# Patient Record
Sex: FEMALE | Race: WHITE | Employment: OTHER | URBAN - METROPOLITAN AREA
[De-identification: names, ages, dates, MRNs, and addresses within clinical notes are randomized per-mention and may not be internally consistent; named-entity substitution may affect disease eponyms.]

---

## 2021-12-01 ENCOUNTER — HOSPITAL ENCOUNTER (EMERGENCY)
Facility: HOSPITAL | Age: 31
Discharge: HOME | End: 2021-12-02
Attending: EMERGENCY MEDICINE
Payer: MEDICAID

## 2021-12-01 DIAGNOSIS — G43.809 OTHER MIGRAINE WITHOUT STATUS MIGRAINOSUS, NOT INTRACTABLE: Primary | ICD-10-CM

## 2021-12-01 DIAGNOSIS — R93.89 ABNORMAL MRI: ICD-10-CM

## 2021-12-01 PROCEDURE — 82962 GLUCOSE BLOOD TEST: CPT

## 2021-12-01 PROCEDURE — 36415 COLL VENOUS BLD VENIPUNCTURE: CPT

## 2021-12-01 PROCEDURE — 99284 EMERGENCY DEPT VISIT MOD MDM: CPT

## 2021-12-02 ENCOUNTER — APPOINTMENT (OUTPATIENT)
Dept: CT IMAGING | Facility: HOSPITAL | Age: 31
End: 2021-12-02
Attending: EMERGENCY MEDICINE
Payer: MEDICAID

## 2021-12-02 ENCOUNTER — APPOINTMENT (OUTPATIENT)
Dept: MRI IMAGING | Facility: HOSPITAL | Age: 31
End: 2021-12-02
Attending: EMERGENCY MEDICINE
Payer: MEDICAID

## 2021-12-02 VITALS
HEART RATE: 65 BPM | DIASTOLIC BLOOD PRESSURE: 89 MMHG | TEMPERATURE: 98 F | OXYGEN SATURATION: 98 % | RESPIRATION RATE: 18 BRPM | SYSTOLIC BLOOD PRESSURE: 125 MMHG

## 2021-12-02 PROCEDURE — 80048 BASIC METABOLIC PNL TOTAL CA: CPT | Performed by: EMERGENCY MEDICINE

## 2021-12-02 PROCEDURE — 70450 CT HEAD/BRAIN W/O DYE: CPT | Performed by: EMERGENCY MEDICINE

## 2021-12-02 PROCEDURE — 85025 COMPLETE CBC W/AUTO DIFF WBC: CPT | Performed by: EMERGENCY MEDICINE

## 2021-12-02 PROCEDURE — 83735 ASSAY OF MAGNESIUM: CPT | Performed by: EMERGENCY MEDICINE

## 2021-12-02 PROCEDURE — 70551 MRI BRAIN STEM W/O DYE: CPT | Performed by: EMERGENCY MEDICINE

## 2021-12-02 RX ORDER — POTASSIUM CHLORIDE 20 MEQ/1
40 TABLET, EXTENDED RELEASE ORAL ONCE
Status: COMPLETED | OUTPATIENT
Start: 2021-12-02 | End: 2021-12-02

## 2021-12-02 NOTE — ED PROVIDER NOTES
Patient Seen in: [facility] AND [facility] Emergency Department      History   Patient presents with:  Stroke    Stated Complaint: headache/slurring words    Subjective:   HPI  Patient is a 28-year-old female history pituitary tumor, migraines presenting with he Abdominal:      General: There is no distension. Tenderness: There is no abdominal tenderness. There is no guarding or rebound. Musculoskeletal:         General: No swelling, tenderness or deformity. Normal range of motion.       Cervical back: Nor hydrocephalus. Visualized paranasal sinuses and mastoids are clear. No calvarial lesion. MR BRAIN WITHOUT CONTRAST    COMPARISON: None    IMPRESSION:    No abnormality on diffusion weighted imaging.   There are periventricular and juxtacortical T2 hy

## 2021-12-02 NOTE — ED INITIAL ASSESSMENT (HPI)
Patient ambulatory to ED with complaint of loss of balance, slurring words, and severe headache since 2PM. Patient is AXOX4.

## 2023-10-29 ENCOUNTER — HOSPITAL ENCOUNTER (EMERGENCY)
Facility: HOSPITAL | Age: 33
Discharge: HOME | End: 2023-10-29
Attending: EMERGENCY MEDICINE
Payer: MEDICAID

## 2023-10-29 VITALS
OXYGEN SATURATION: 98 % | HEIGHT: 64 IN | DIASTOLIC BLOOD PRESSURE: 92 MMHG | RESPIRATION RATE: 18 BRPM | HEART RATE: 86 BPM | TEMPERATURE: 98 F | WEIGHT: 194 LBS | SYSTOLIC BLOOD PRESSURE: 121 MMHG | BODY MASS INDEX: 33.12 KG/M2

## 2023-10-29 DIAGNOSIS — R60.0 SALIVARY GLAND SWELLING: Primary | ICD-10-CM

## 2023-10-29 PROCEDURE — 87798 DETECT AGENT NOS DNA AMP: CPT | Performed by: EMERGENCY MEDICINE

## 2023-10-29 PROCEDURE — 86735 MUMPS ANTIBODY: CPT | Performed by: EMERGENCY MEDICINE

## 2023-10-29 PROCEDURE — 36415 COLL VENOUS BLD VENIPUNCTURE: CPT

## 2023-10-29 PROCEDURE — 99283 EMERGENCY DEPT VISIT LOW MDM: CPT

## 2023-10-29 RX ORDER — DEXAMETHASONE SODIUM PHOSPHATE 4 MG/ML
8 INJECTION, SOLUTION INTRA-ARTICULAR; INTRALESIONAL; INTRAMUSCULAR; INTRAVENOUS; SOFT TISSUE ONCE
Status: COMPLETED | OUTPATIENT
Start: 2023-10-29 | End: 2023-10-29

## 2023-10-29 NOTE — DISCHARGE INSTRUCTIONS
Take Tylenol as needed for discomfort. You have been given a long-acting steroid in the emergency department, you should not need additional doses of this medication. See primary care if not improving in the next 2 to 3 days. Return to the ER if you develop worsening symptoms, difficulty breathing or swallowing, inability to tolerate fluids, or any emergent concerns. [Seizure] : seizures [Hypotonicity (Flaccid)] : hypotonic [Diaphoresis] : diaphoretic [Fever] : no fever [Stridor] : no stridor [Cyanosis] : no cyanosis [Edema] : no edema [Tachypnea] : not tachypneic [Wheezing] : no wheezing [Cough] : no cough [Vomiting] : no vomiting [Diarrhea] : no diarrhea [Puffy Hands/Feet] : no hand/feet puffiness [Rash] : no rash [Bruising] : no tendency for easy bruising [Dec Urine Output] : no oliguria [FreeTextEntry2] : low muscle tone - GJ tube for feeds and meds - Ketogenic diet

## 2023-10-29 NOTE — ED INITIAL ASSESSMENT (HPI)
33 y/o female arrives with x 4 days of bilateral preauricular and submandibular swelling. Pt contacted her pcp and was told to come in for eval of possible parotitis. Pt reports recent URI sx. Denies fever/chills. No difficulty breathing.

## 2023-10-30 LAB — MUV IGG SER IA-ACNC: 16.9 AU/ML (ref 11–?)

## 2023-10-31 LAB — MUMPS IGM ABS: <0.8 AU

## 2023-11-01 LAB — MUMPS ANTIBODY RNA (PCR),QUALITATIVE: NEGATIVE

## 2023-11-05 ENCOUNTER — EXTERNAL LAB (OUTPATIENT)
Dept: OTHER | Age: 33
End: 2023-11-05

## 2023-11-05 LAB
25(OH)D3+25(OH)D2 SERPL-MCNC: 25.1 NG/ML (ref 30–100)
ALBUMIN SERPL-MCNC: 4.5 G/DL (ref 3.3–5.2)
ALP SERPL-CCNC: 67 U/L (ref 35–115)
ALT SERPL-CCNC: 9 U/L (ref 7–48)
AMORPH SED URNS QL MICRO: ABNORMAL
APPEARANCE UR: ABNORMAL
AST SERPL-CCNC: 10 U/L (ref 7–48)
B-HCG UR QL: NEGATIVE
BACTERIA #/AREA URNS HPF: ABNORMAL /[HPF]
BASOPHILS NFR BLD: 0.8 % (ref 0–5)
BILIRUB SERPL-MCNC: 0.5 MG/DL (ref 0.2–1.2)
BILIRUB UR QL: NEGATIVE
BUN SERPL-MCNC: 17 MG/DL (ref 6–24)
BUN/CREAT SERPL: 20.7 MG/DL (ref 5–26)
CALCIUM SERPL-MCNC: 9.2 MG/DL (ref 8.3–11)
CHLORIDE SERPL-SCNC: 102 MEQ/L (ref 98–108)
CHOLEST SERPL-MCNC: 258 MG/DL (ref 134–200)
CHOLEST/HDLC SERPL: 4.4 {RATIO} (ref 0–5)
CO2 SERPL-SCNC: 27 MEQ/L (ref 21–31)
COLOR UR: ABNORMAL
CREAT SERPL-MCNC: 0.82 MG/DL (ref 0.6–1.4)
EOSINOPHIL NFR BLD: 1 % (ref 0–6)
EPI CELLS #/AREA URNS LPF: ABNORMAL /[LPF]
ERYTHROCYTE [DISTWIDTH] IN BLOOD: 14.6 % (ref 11.5–14.5)
ERYTHROCYTE [SEDIMENTATION RATE] IN BLOOD BY WESTERGREN METHOD: 6 MM/HR (ref 0–15)
FERRITIN SERPL-MCNC: 8.1 NG/ML (ref 11–307)
FOLATE SERPL-MCNC: 13.46 NG/ML (ref 5.21–40)
FSH SERPL-ACNC: 7.05 MU/ML
GFR SERPLBLD SCHWARTZ-ARVRAT: 98 ML/MIN
GLUCOSE SERPL-MCNC: 78 MG/DL (ref 65–110)
GLUCOSE UR-MCNC: NORMAL MG/DL
HBA1C MFR BLD: 5.2 % (ref 0–5.7)
HCT VFR BLD CALC: 38.5 % (ref 37–47)
HDLC SERPL-MCNC: 58 MG/DL
HGB BLD-MCNC: 12.4 GM/DL (ref 12.1–15.1)
HGB UR QL: ABNORMAL
IRON SERPL-MCNC: 76 UG/DL (ref 55–165)
KETONES UR-MCNC: NEGATIVE MG/DL
LAB RESULT: NORMAL
LDLC SERPL CALC-MCNC: 137.4 MG/DL (ref 30–100)
LENGTH OF FAST TIME PATIENT: ABNORMAL H
LENGTH OF FAST TIME PATIENT: NORMAL H
LEUKOCYTE ESTERASE UR QL STRIP: 500
LH SERPL-ACNC: 12.15 MU/ML (ref 1.2–1286)
LYMPHOCYTES NFR BLD: 31.4 % (ref 18–47)
MAGNESIUM SERPL-MCNC: 1.89 MG/DL (ref 1.7–2.8)
MCH RBC QN AUTO: 29.7 PG (ref 27–33)
MCHC RBC AUTO-ENTMCNC: 32.1 G/DL (ref 32–36)
MCV RBC AUTO: 92.5 FL (ref 82–99)
MONOCYTES NFR BLD: 6.2 % (ref 0–12)
MPV (OFFPRE2): 9.3 FL (ref 7.4–10.4)
NEUTROPHILS # BLD: 8.7 10**3/UL (ref 1.8–7.7)
NEUTROPHILS NFR BLD: ABNORMAL % (ref 40–75)
NITRITE UR QL: NEGATIVE
PH UR: 6 [PH] (ref 5–9)
PLATELET # BLD: 277 10**3/UL (ref 130–400)
POTASSIUM SERPL-SCNC: 3.5 MEQ/L (ref 3.5–5.3)
PROLACTIN SERPL-MCNC: 28.8 NG/ML (ref 0–25)
PROT SERPL-MCNC: 7 GM/DL (ref 6–8.5)
PROT UR QL: 100 MG/DL
RBC # BLD: 4.16 10**6/UL (ref 4.1–5.4)
RBC #/AREA URNS HPF: ABNORMAL /[HPF]
SODIUM SERPL-SCNC: 139 MEQ/L (ref 135–148)
SP GR UR: 1.04 (ref 1–1.03)
T3RU NFR SERPL: 41.6 % (ref 32–48.4)
T4 SERPL-MCNC: 8.38 UG/DL (ref 4.5–11.8)
TIBC SERPL-MCNC: 314 UG/DL (ref 257–369)
TRIGL SERPL-MCNC: 313 MG/DL (ref 30–150)
TSH SERPL-ACNC: 2 UU/ML (ref 0.35–6)
UROBILINOGEN UR QL: <2 MG/DL
VIT B12 SERPL-MCNC: 152 PG/ML (ref 180–914)
VLDLC SERPL CALC-MCNC: 62.6 MG/DL (ref 0–120)
WBC # BLD: 14.4 10**3/UL (ref 4–10)
WBC #/AREA URNS HPF: ABNORMAL /[HPF]
YEAST URNS QL MICRO: ABNORMAL

## 2023-11-14 ENCOUNTER — EXTERNAL RECORD (OUTPATIENT)
Dept: HEALTH INFORMATION MANAGEMENT | Facility: OTHER | Age: 33
End: 2023-11-14

## 2023-11-28 ENCOUNTER — TELEPHONE (OUTPATIENT)
Dept: ENDOCRINOLOGY | Age: 33
End: 2023-11-28

## 2023-11-28 ENCOUNTER — EXTERNAL RECORD (OUTPATIENT)
Dept: HEALTH INFORMATION MANAGEMENT | Facility: OTHER | Age: 33
End: 2023-11-28

## 2023-11-29 ENCOUNTER — APPOINTMENT (OUTPATIENT)
Dept: ENDOCRINOLOGY | Age: 33
End: 2023-11-29

## 2024-04-18 ENCOUNTER — EXTERNAL LAB (OUTPATIENT)
Dept: HEALTH INFORMATION MANAGEMENT | Facility: OTHER | Age: 34
End: 2024-04-18

## 2024-04-18 LAB
25(OH)D3+25(OH)D2 SERPL-MCNC: 22.1 NG/ML (ref 30–100)
ALBUMIN SERPL-MCNC: 4.6 G/DL (ref 3.3–5.2)
ALP SERPL-CCNC: 80 U/L (ref 35–115)
ALT SERPL-CCNC: 12 U/L (ref 7–48)
APTT PPP: 30.1 SECONDS (ref 15–37)
AST SERPL-CCNC: 14 U/L (ref 7–48)
BASOPHILS NFR BLD: 0.5 % (ref 0–5)
BILIRUB SERPL-MCNC: 0.5 MG/DL (ref 0.2–1.2)
BUN SERPL-MCNC: 10 MG/DL (ref 6–24)
BUN/CREAT SERPL: 12.3 MG/DL (ref 5–26)
CALCIUM SERPL-MCNC: 9.6 MG/DL (ref 8.3–11)
CHLORIDE SERPL-SCNC: 102 MEQ/L (ref 98–108)
CHOLEST SERPL-MCNC: 256 MG/DL (ref 134–200)
CHOLEST/HDLC SERPL: 5.3 {RATIO} (ref 0–5)
CO2 SERPL-SCNC: 26 MEQ/L (ref 21–31)
CREAT SERPL-MCNC: 0.81 MG/DL (ref 0.6–1.4)
EOSINOPHIL NFR BLD: 1.1 % (ref 0–6)
ERYTHROCYTE [DISTWIDTH] IN BLOOD: 14.7 % (ref 11.5–14.5)
ERYTHROCYTE [SEDIMENTATION RATE] IN BLOOD BY WESTERGREN METHOD: 5 MM/HR (ref 0–15)
FERRITIN SERPL-MCNC: 14.6 NG/ML (ref 11–307)
FSH SERPL-ACNC: 4.95 MU/ML
FT4I SERPL CALC-MCNC: 10.7 UG/DL (ref 4–11)
GFR SERPLBLD SCHWARTZ-ARVRAT: 98.9 ML/MIN
GLUCOSE SERPL-MCNC: 79 MG/DL (ref 65–110)
HCG SERPL QL: NEGATIVE
HCT VFR BLD CALC: 41.8 % (ref 37–47)
HDLC SERPL-MCNC: 48 MG/DL
HGB BLD-MCNC: 13.6 GM/DL (ref 12.1–15.1)
INR PPP: 0.95 (ref 2–3)
IRON SATN MFR SERPL: 20.7 %
IRON SERPL-MCNC: 83 UG/DL (ref 55–165)
LAB RESULT: NORMAL
LDLC SERPL CALC-MCNC: 144 MG/DL (ref 30–100)
LENGTH OF FAST TIME PATIENT: ABNORMAL H
LENGTH OF FAST TIME PATIENT: NORMAL H
LH SERPL-ACNC: 10.44 MU/ML
LYMPHOCYTES NFR BLD: 13.8 % (ref 18–47)
MAGNESIUM SERPL-MCNC: 1.75 MG/DL (ref 1.7–2.8)
MCH RBC QN AUTO: 29 PG (ref 27–33)
MCHC RBC AUTO-ENTMCNC: 32.5 G/DL (ref 32–36)
MCV RBC AUTO: 89.3 FL (ref 82–99)
MONOCYTES NFR BLD: 5.6 % (ref 0–12)
NEUTROPHILS # BLD: 8.7 10**3/UL (ref 1.8–7.7)
NEUTROPHILS NFR BLD: 79 % (ref 40–75)
PLATELET # BLD: 245 10**3/UL (ref 130–400)
PMV BLD AUTO: 9.5 FL (ref 7.4–10.4)
POTASSIUM SERPL-SCNC: 4.6 MEQ/L (ref 3.5–5.3)
PROLACTIN SERPL-MCNC: 21.93 NG/ML (ref 0–25)
PROT SERPL-MCNC: 7.3 GM/DL (ref 6–8.5)
PROTHROMBIN TIME: 10.9 SECONDS
RBC # BLD: 4.69 10**6/UL (ref 4.1–5.4)
SODIUM SERPL-SCNC: 139 MEQ/L (ref 135–148)
T3RU NFR SERPL: 42.9 % (ref 32–48.4)
T4 SERPL-MCNC: 9.99 UG/DL (ref 4.5–11.8)
TIBC SERPL-MCNC: 318 UG/DL (ref 257–369)
TRIGL SERPL-MCNC: 320 MG/DL (ref 30–150)
TSH SERPL-ACNC: 1.79 UU/ML (ref 0.35–6)
VLDLC SERPL CALC-MCNC: 64 MG/DL (ref 0–120)
WBC # BLD: 11 10**3/UL (ref 4–10)

## 2024-05-01 ENCOUNTER — APPOINTMENT (OUTPATIENT)
Dept: ENDOCRINOLOGY | Age: 34
End: 2024-05-01

## 2024-05-01 ENCOUNTER — LAB SERVICES (OUTPATIENT)
Dept: ENDOCRINOLOGY | Age: 34
End: 2024-05-01

## 2024-05-01 VITALS
SYSTOLIC BLOOD PRESSURE: 126 MMHG | HEIGHT: 64 IN | WEIGHT: 195.66 LBS | TEMPERATURE: 97.5 F | DIASTOLIC BLOOD PRESSURE: 92 MMHG | HEART RATE: 70 BPM | OXYGEN SATURATION: 98 % | BODY MASS INDEX: 33.4 KG/M2

## 2024-05-01 DIAGNOSIS — L68.0 HIRSUTISM: ICD-10-CM

## 2024-05-01 DIAGNOSIS — R79.89 ELEVATED PROLACTIN LEVEL: Primary | ICD-10-CM

## 2024-05-01 DIAGNOSIS — R63.5 WEIGHT GAIN, ABNORMAL: ICD-10-CM

## 2024-05-01 DIAGNOSIS — E78.5 DYSLIPIDEMIA: ICD-10-CM

## 2024-05-01 DIAGNOSIS — R79.89 ELEVATED PROLACTIN LEVEL: ICD-10-CM

## 2024-05-01 DIAGNOSIS — N92.6 IRREGULAR MENSTRUAL CYCLE: ICD-10-CM

## 2024-05-01 PROCEDURE — 36415 COLL VENOUS BLD VENIPUNCTURE: CPT | Performed by: INTERNAL MEDICINE

## 2024-05-01 PROCEDURE — 84146 ASSAY OF PROLACTIN: CPT | Performed by: CLINICAL MEDICAL LABORATORY

## 2024-05-01 PROCEDURE — 83498 ASY HYDROXYPROGESTERONE 17-D: CPT | Performed by: CLINICAL MEDICAL LABORATORY

## 2024-05-01 PROCEDURE — 99205 OFFICE O/P NEW HI 60 MIN: CPT | Performed by: INTERNAL MEDICINE

## 2024-05-01 PROCEDURE — 84439 ASSAY OF FREE THYROXINE: CPT | Performed by: CLINICAL MEDICAL LABORATORY

## 2024-05-01 PROCEDURE — 82627 DEHYDROEPIANDROSTERONE: CPT | Performed by: CLINICAL MEDICAL LABORATORY

## 2024-05-01 PROCEDURE — 83002 ASSAY OF GONADOTROPIN (LH): CPT | Performed by: CLINICAL MEDICAL LABORATORY

## 2024-05-01 PROCEDURE — 84403 ASSAY OF TOTAL TESTOSTERONE: CPT | Performed by: CLINICAL MEDICAL LABORATORY

## 2024-05-01 PROCEDURE — 83036 HEMOGLOBIN GLYCOSYLATED A1C: CPT | Performed by: CLINICAL MEDICAL LABORATORY

## 2024-05-01 PROCEDURE — 84443 ASSAY THYROID STIM HORMONE: CPT | Performed by: CLINICAL MEDICAL LABORATORY

## 2024-05-01 PROCEDURE — 83001 ASSAY OF GONADOTROPIN (FSH): CPT | Performed by: CLINICAL MEDICAL LABORATORY

## 2024-05-01 RX ORDER — ATORVASTATIN CALCIUM 40 MG/1
40 TABLET, FILM COATED ORAL DAILY
COMMUNITY
Start: 2024-04-29

## 2024-05-02 LAB
DHEA-S SERPL-MCNC: 524.8 MCG/DL (ref 8–391)
FSH SERPL-ACNC: 7.5 MUNITS/ML
HBA1C MFR BLD: 4.8 % (ref 4.5–5.6)
LH SERPL-ACNC: 11.2 MUNITS/ML
PROLACTIN SERPL-MCNC: 12.8 NG/ML (ref 2.8–29.2)
T4 FREE SERPL-MCNC: 0.9 NG/DL (ref 0.8–1.5)
TSH SERPL-ACNC: 1.68 MCUNITS/ML (ref 0.35–5)

## 2024-05-03 LAB — PROLACTIN SERPL IA-MCNC: 12 NG/ML (ref 2.8–29.2)

## 2024-05-04 ENCOUNTER — LAB SERVICES (OUTPATIENT)
Dept: LAB | Age: 34
End: 2024-05-04

## 2024-05-04 ENCOUNTER — APPOINTMENT (OUTPATIENT)
Dept: LAB | Age: 34
End: 2024-05-04

## 2024-05-04 DIAGNOSIS — N92.6 IRREGULAR MENSTRUATION, UNSPECIFIED: ICD-10-CM

## 2024-05-04 DIAGNOSIS — R63.5 ABNORMAL WEIGHT GAIN: ICD-10-CM

## 2024-05-04 DIAGNOSIS — R79.89 OTHER SPECIFIED ABNORMAL FINDINGS OF BLOOD CHEMISTRY: Primary | ICD-10-CM

## 2024-05-05 LAB
17OHP SERPL-MCNC: 32.19 NG/DL
PROLACTIN SERPL IA-MCNC: 13.4 NG/ML (ref 2.8–29.2)
PROLACTIN.MONOMERIC SERPL-MCNC: 4.1 NG/ML (ref 2.8–19.5)
PROLACTIN.MONOMERIC/TOTAL MFR SERPL: 30.6 %
TESTOST SERPL-MCNC: 39 NG/DL (ref 9–55)

## 2024-05-08 ENCOUNTER — TELEPHONE (OUTPATIENT)
Dept: ENDOCRINOLOGY | Age: 34
End: 2024-05-08

## 2024-06-18 ENCOUNTER — APPOINTMENT (OUTPATIENT)
Dept: ENDOCRINOLOGY | Age: 34
End: 2024-06-18

## 2024-08-05 ENCOUNTER — TELEPHONE (OUTPATIENT)
Dept: ENDOCRINOLOGY | Age: 34
End: 2024-08-05

## 2025-05-07 ENCOUNTER — OFFICE VISIT (OUTPATIENT)
Dept: ENDOCRINOLOGY | Age: 35
End: 2025-05-07

## 2025-05-07 VITALS
HEIGHT: 64 IN | TEMPERATURE: 97.8 F | DIASTOLIC BLOOD PRESSURE: 83 MMHG | WEIGHT: 206.79 LBS | OXYGEN SATURATION: 99 % | HEART RATE: 68 BPM | SYSTOLIC BLOOD PRESSURE: 132 MMHG | BODY MASS INDEX: 35.3 KG/M2

## 2025-05-07 DIAGNOSIS — E78.5 DYSLIPIDEMIA: ICD-10-CM

## 2025-05-07 DIAGNOSIS — R79.89 ELEVATED PROLACTIN LEVEL: ICD-10-CM

## 2025-05-07 DIAGNOSIS — N92.6 IRREGULAR MENSTRUAL CYCLE: ICD-10-CM

## 2025-05-07 DIAGNOSIS — L68.0 HIRSUTISM: ICD-10-CM

## 2025-05-07 DIAGNOSIS — R63.5 WEIGHT GAIN, ABNORMAL: ICD-10-CM

## 2025-05-07 DIAGNOSIS — Z91.89 AT RISK FOR SLEEP APNEA: Primary | ICD-10-CM

## 2025-05-07 PROCEDURE — 99214 OFFICE O/P EST MOD 30 MIN: CPT | Performed by: INTERNAL MEDICINE

## 2025-05-07 PROCEDURE — G2211 COMPLEX E/M VISIT ADD ON: HCPCS | Performed by: INTERNAL MEDICINE

## 2025-05-07 RX ORDER — SEMAGLUTIDE 0.25 MG/.5ML
0.25 INJECTION, SOLUTION SUBCUTANEOUS
Qty: 2 ML | Refills: 1 | Status: SHIPPED | OUTPATIENT
Start: 2025-05-07

## 2025-05-09 ENCOUNTER — OFFICE VISIT (OUTPATIENT)
Dept: SLEEP MEDICINE | Age: 35
End: 2025-05-09

## 2025-05-09 VITALS
HEIGHT: 64 IN | DIASTOLIC BLOOD PRESSURE: 101 MMHG | OXYGEN SATURATION: 97 % | WEIGHT: 206 LBS | SYSTOLIC BLOOD PRESSURE: 156 MMHG | BODY MASS INDEX: 35.17 KG/M2 | HEART RATE: 80 BPM | TEMPERATURE: 98.4 F

## 2025-05-09 DIAGNOSIS — G47.33 OBSTRUCTIVE SLEEP APNEA (ADULT) (PEDIATRIC): Primary | ICD-10-CM

## 2025-05-09 PROCEDURE — 99204 OFFICE O/P NEW MOD 45 MIN: CPT

## 2025-05-09 PROCEDURE — 99202 OFFICE O/P NEW SF 15 MIN: CPT

## 2025-05-09 ASSESSMENT — ENCOUNTER SYMPTOMS
SNORING: 1
EYES NEGATIVE: 1
CONSTITUTIONAL NEGATIVE: 1
ENDOCRINE NEGATIVE: 1
GASTROINTESTINAL NEGATIVE: 1
SLEEP DISTURBANCES DUE TO BREATHING: 1
ALLERGIC/IMMUNOLOGIC NEGATIVE: 1
EXCESSIVE DAYTIME SLEEPINESS: 1
HEADACHES: 1
INSOMNIA: 1

## 2025-05-21 ENCOUNTER — OFFICE VISIT (OUTPATIENT)
Dept: SLEEP MEDICINE | Age: 35
End: 2025-05-21

## 2025-05-21 DIAGNOSIS — G47.33 OBSTRUCTIVE SLEEP APNEA (ADULT) (PEDIATRIC): ICD-10-CM

## 2025-05-21 LAB — REPORT TEXT: NORMAL

## 2025-05-21 PROCEDURE — 95806 SLEEP STUDY UNATT&RESP EFFT: CPT | Performed by: SPECIALIST

## 2025-05-30 ENCOUNTER — OFFICE VISIT (OUTPATIENT)
Dept: SLEEP MEDICINE | Age: 35
End: 2025-05-30

## 2025-05-30 VITALS
SYSTOLIC BLOOD PRESSURE: 152 MMHG | HEIGHT: 65 IN | TEMPERATURE: 98.5 F | HEART RATE: 77 BPM | BODY MASS INDEX: 34.82 KG/M2 | WEIGHT: 209 LBS | OXYGEN SATURATION: 96 % | DIASTOLIC BLOOD PRESSURE: 106 MMHG

## 2025-05-30 DIAGNOSIS — G47.33 OBSTRUCTIVE SLEEP APNEA (ADULT) (PEDIATRIC): Primary | ICD-10-CM

## 2025-05-30 PROCEDURE — 99211 OFF/OP EST MAY X REQ PHY/QHP: CPT

## 2025-05-30 PROCEDURE — 99214 OFFICE O/P EST MOD 30 MIN: CPT

## 2025-05-30 RX ORDER — LISINOPRIL 10 MG/1
10 TABLET ORAL DAILY
COMMUNITY
Start: 2025-05-13

## 2025-05-30 RX ORDER — NORETHINDRONE AND ETHINYL ESTRADIOL 1 MG-35MCG
1 KIT ORAL DAILY
COMMUNITY
Start: 2025-05-09

## 2025-05-30 ASSESSMENT — SLEEP AND FATIGUE QUESTIONNAIRES
HOW LIKELY ARE YOU TO NOD OFF OR FALL ASLEEP WHILE LYING DOWN TO REST IN THE AFTERNOON WHEN CIRCUMSTANCES PERMIT: HIGH CHANCE OF DOZING
ESS TOTAL SCORE: 22
HOW LIKELY ARE YOU TO NOD OFF OR FALL ASLEEP WHILE SITTING AND READING: HIGH CHANCE OF DOZING
HOW LIKELY ARE YOU TO NOD OFF OR FALL ASLEEP WHILE SITTING QUIETLY AFTER LUNCH WITHOUT ALCOHOL: HIGH CHANCE OF DOZING
HOW LIKELY ARE YOU TO NOD OFF OR FALL ASLEEP WHEN YOU ARE A PASSENGER IN A CAR FOR AN HOUR WITHOUT A BREAK: MODERATE CHANCE OF DOZING
HOW LIKELY ARE YOU TO NOD OFF OR FALL ASLEEP WHILE SITTING INACTIVE IN A PUBLIC PLACE: HIGH CHANCE OF DOZING
HOW LIKELY ARE YOU TO NOD OFF OR FALL ASLEEP IN A CAR, WHILE STOPPED FOR A FEW MINUTES IN TRAFFIC: MODERATE CHANCE OF DOZING
HOW LIKELY ARE YOU TO NOD OFF OR FALL ASLEEP WHILE WATCHING TV: HIGH CHANCE OF DOZING
HOW LIKELY ARE YOU TO NOD OFF OR FALL ASLEEP WHILE SITTING AND TALKING TO SOMEONE: HIGH CHANCE OF DOZING

## 2025-06-13 ENCOUNTER — TELEPHONE (OUTPATIENT)
Dept: FAMILY MEDICINE | Age: 35
End: 2025-06-13

## 2025-06-16 ENCOUNTER — APPOINTMENT (OUTPATIENT)
Dept: FAMILY MEDICINE | Age: 35
End: 2025-06-16

## 2025-06-18 ENCOUNTER — TELEPHONE (OUTPATIENT)
Dept: BEHAVIORAL HEALTH | Age: 35
End: 2025-06-18

## 2025-08-07 ENCOUNTER — TELEPHONE (OUTPATIENT)
Dept: FAMILY MEDICINE | Age: 35
End: 2025-08-07

## 2025-08-07 ENCOUNTER — APPOINTMENT (OUTPATIENT)
Dept: ENDOCRINOLOGY | Age: 35
End: 2025-08-07